# Patient Record
Sex: MALE | Race: OTHER | Employment: OTHER | URBAN - METROPOLITAN AREA
[De-identification: names, ages, dates, MRNs, and addresses within clinical notes are randomized per-mention and may not be internally consistent; named-entity substitution may affect disease eponyms.]

---

## 2022-11-04 PROCEDURE — 12015 RPR F/E/E/N/L/M 7.6-12.5 CM: CPT

## 2022-11-04 PROCEDURE — 36415 COLL VENOUS BLD VENIPUNCTURE: CPT

## 2022-11-04 PROCEDURE — 93010 ELECTROCARDIOGRAM REPORT: CPT

## 2022-11-04 PROCEDURE — 99285 EMERGENCY DEPT VISIT HI MDM: CPT

## 2022-11-05 ENCOUNTER — HOSPITAL ENCOUNTER (EMERGENCY)
Facility: HOSPITAL | Age: 42
Discharge: HOME | End: 2022-11-05
Attending: EMERGENCY MEDICINE
Payer: MEDICAID

## 2022-11-05 ENCOUNTER — APPOINTMENT (OUTPATIENT)
Dept: MRI IMAGING | Facility: HOSPITAL | Age: 42
End: 2022-11-05
Attending: EMERGENCY MEDICINE
Payer: MEDICAID

## 2022-11-05 ENCOUNTER — APPOINTMENT (OUTPATIENT)
Dept: CT IMAGING | Facility: HOSPITAL | Age: 42
End: 2022-11-05
Attending: EMERGENCY MEDICINE
Payer: MEDICAID

## 2022-11-05 VITALS
DIASTOLIC BLOOD PRESSURE: 98 MMHG | WEIGHT: 185 LBS | BODY MASS INDEX: 25.9 KG/M2 | OXYGEN SATURATION: 97 % | HEIGHT: 71 IN | HEART RATE: 76 BPM | SYSTOLIC BLOOD PRESSURE: 126 MMHG | TEMPERATURE: 98 F | RESPIRATION RATE: 16 BRPM

## 2022-11-05 DIAGNOSIS — R73.9 HYPERGLYCEMIA: ICD-10-CM

## 2022-11-05 DIAGNOSIS — R90.89 ABNORMAL CT OF BRAIN: ICD-10-CM

## 2022-11-05 DIAGNOSIS — S00.03XA HEMATOMA OF SCALP, INITIAL ENCOUNTER: ICD-10-CM

## 2022-11-05 DIAGNOSIS — S02.2XXA CLOSED FRACTURE OF NASAL BONE, INITIAL ENCOUNTER: ICD-10-CM

## 2022-11-05 DIAGNOSIS — R55 SYNCOPE AND COLLAPSE: Primary | ICD-10-CM

## 2022-11-05 DIAGNOSIS — S01.111A COMPLEX LACERATION OF RIGHT EYEBROW, INITIAL ENCOUNTER: ICD-10-CM

## 2022-11-05 DIAGNOSIS — S01.21XA NASAL LACERATION, INITIAL ENCOUNTER: ICD-10-CM

## 2022-11-05 LAB
ALBUMIN SERPL-MCNC: 4.3 G/DL (ref 3.4–5)
ALBUMIN/GLOB SERPL: 1.2 {RATIO} (ref 1–2)
ALP LIVER SERPL-CCNC: 53 U/L
ALT SERPL-CCNC: 75 U/L
ANION GAP SERPL CALC-SCNC: 4 MMOL/L (ref 0–18)
AST SERPL-CCNC: 40 U/L (ref 15–37)
ATRIAL RATE: 73 BPM
BASOPHILS # BLD AUTO: 0.04 X10(3) UL (ref 0–0.2)
BASOPHILS NFR BLD AUTO: 0.3 %
BILIRUB SERPL-MCNC: 1.5 MG/DL (ref 0.1–2)
BUN BLD-MCNC: 13 MG/DL (ref 7–18)
CALCIUM BLD-MCNC: 9.4 MG/DL (ref 8.5–10.1)
CHLORIDE SERPL-SCNC: 106 MMOL/L (ref 98–112)
CO2 SERPL-SCNC: 29 MMOL/L (ref 21–32)
CREAT BLD-MCNC: 1 MG/DL
EOSINOPHIL # BLD AUTO: 0.03 X10(3) UL (ref 0–0.7)
EOSINOPHIL NFR BLD AUTO: 0.2 %
ERYTHROCYTE [DISTWIDTH] IN BLOOD BY AUTOMATED COUNT: 13 %
ETHANOL SERPL-MCNC: 6 MG/DL (ref ?–3)
GFR SERPLBLD BASED ON 1.73 SQ M-ARVRAT: 96 ML/MIN/1.73M2 (ref 60–?)
GLOBULIN PLAS-MCNC: 3.5 G/DL (ref 2.8–4.4)
GLUCOSE BLD-MCNC: 130 MG/DL (ref 70–99)
HCT VFR BLD AUTO: 44.8 %
HGB BLD-MCNC: 15.9 G/DL
IMM GRANULOCYTES # BLD AUTO: 0.03 X10(3) UL (ref 0–1)
IMM GRANULOCYTES NFR BLD: 0.2 %
LYMPHOCYTES # BLD AUTO: 1.3 X10(3) UL (ref 1–4)
LYMPHOCYTES NFR BLD AUTO: 9.2 %
MCH RBC QN AUTO: 31 PG (ref 26–34)
MCHC RBC AUTO-ENTMCNC: 35.5 G/DL (ref 31–37)
MCV RBC AUTO: 87.3 FL
MONOCYTES # BLD AUTO: 0.56 X10(3) UL (ref 0.1–1)
MONOCYTES NFR BLD AUTO: 3.9 %
NEUTROPHILS # BLD AUTO: 12.23 X10 (3) UL (ref 1.5–7.7)
NEUTROPHILS # BLD AUTO: 12.23 X10(3) UL (ref 1.5–7.7)
NEUTROPHILS NFR BLD AUTO: 86.2 %
OSMOLALITY SERPL CALC.SUM OF ELEC: 290 MOSM/KG (ref 275–295)
P AXIS: 50 DEGREES
P-R INTERVAL: 142 MS
PLATELET # BLD AUTO: 270 10(3)UL (ref 150–450)
POTASSIUM SERPL-SCNC: 4.1 MMOL/L (ref 3.5–5.1)
PROT SERPL-MCNC: 7.8 G/DL (ref 6.4–8.2)
Q-T INTERVAL: 364 MS
QRS DURATION: 72 MS
QTC CALCULATION (BEZET): 401 MS
R AXIS: 20 DEGREES
RBC # BLD AUTO: 5.13 X10(6)UL
SODIUM SERPL-SCNC: 139 MMOL/L (ref 136–145)
T AXIS: 25 DEGREES
TROPONIN I HIGH SENSITIVITY: 4 NG/L
VENTRICULAR RATE: 73 BPM
WBC # BLD AUTO: 14.2 X10(3) UL (ref 4–11)

## 2022-11-05 PROCEDURE — 70450 CT HEAD/BRAIN W/O DYE: CPT | Performed by: EMERGENCY MEDICINE

## 2022-11-05 PROCEDURE — 85025 COMPLETE CBC W/AUTO DIFF WBC: CPT | Performed by: EMERGENCY MEDICINE

## 2022-11-05 PROCEDURE — 72125 CT NECK SPINE W/O DYE: CPT | Performed by: EMERGENCY MEDICINE

## 2022-11-05 PROCEDURE — 82077 ASSAY SPEC XCP UR&BREATH IA: CPT | Performed by: EMERGENCY MEDICINE

## 2022-11-05 PROCEDURE — 84484 ASSAY OF TROPONIN QUANT: CPT | Performed by: EMERGENCY MEDICINE

## 2022-11-05 PROCEDURE — 93005 ELECTROCARDIOGRAM TRACING: CPT

## 2022-11-05 PROCEDURE — 70486 CT MAXILLOFACIAL W/O DYE: CPT | Performed by: EMERGENCY MEDICINE

## 2022-11-05 PROCEDURE — 70551 MRI BRAIN STEM W/O DYE: CPT | Performed by: EMERGENCY MEDICINE

## 2022-11-05 PROCEDURE — 80053 COMPREHEN METABOLIC PANEL: CPT | Performed by: EMERGENCY MEDICINE

## 2022-11-05 RX ORDER — CEPHALEXIN 500 MG/1
500 CAPSULE ORAL 2 TIMES DAILY
Qty: 14 CAPSULE | Refills: 0 | Status: SHIPPED | OUTPATIENT
Start: 2022-11-05 | End: 2022-11-12

## 2022-11-05 RX ORDER — HYDROCODONE BITARTRATE AND ACETAMINOPHEN 5; 325 MG/1; MG/1
1 TABLET ORAL ONCE
Status: COMPLETED | OUTPATIENT
Start: 2022-11-05 | End: 2022-11-05

## 2022-11-05 NOTE — ED QUICK NOTES
On arrival to desk, prior to pts name being entered into system pts friends states \"I am going to need you to expedite getting him seen as soon as possible\"   explained to both pt and friend that pts are seen by acuity and we have long wait times at this time. Pts friend argumentative and demanding to get pt seen sooner. Explained again and limits set.

## 2022-11-05 NOTE — ED NOTES
MRI BRAIN (CPT=70551)    Result Date: 11/5/2022  PROCEDURE:  MRI BRAIN (CPT=70551)  LOCATION:  [facility] , CT, CT BRAIN OR HEAD ([id number]), 11/05/2022, 3:47 AM.  INDICATIONS:  r/o midbrain ICH per Ct brain  TECHNIQUE:  MRI of the brain was performed with multi-planar T1, T2-weighted images with FLAIR sequences and diffusion weighted images without infusion. PATIENT STATED HISTORY: (As transcribed by Technologist)  Syncope with collapse this am. Patient fell and hit his head. Findings on Cat Scan today. Evaluate for midbrain ICH. FINDINGS:  INTRACRANIAL:  There are no focal parenchymal brain abnormalities. Diffusion weighted imaging was       performed and is unremarkable. There is no evidence for acute infarction. There is no      evidence of hemorrhage or mass lesion. VENTRICLES/SULCI:   Ventricles and sulci are normal in caliber. There are no extra-axial fluid collections. There is no midline shift. SINUSES/ORBITS:       The visualized paranasal sinuses are clear. The orbits are unremarkable. MASTOIDS:      The mastoids are clear. CONCLUSION:  No acute intracranial abnormality. Hyperdensity in the right mid brain is compatible with artifact. Dictated by (CST): Sakshi Henderson MD on 11/05/2022 at 9:52 AM     Finalized by (CST): Sakshi Henderson MD on 11/05/2022 at 9:57 AM     .  Patient was signed out that if MRI is negative for any acute pathology patient can go home. CT MRI does not show any acute intracranial pathology.

## 2022-11-05 NOTE — ED QUICK NOTES
Pt reevaluated by er physician.  Pt informed of all his test reports and plan of care especially follow up with ent and plastic surgeons assigned, pt verbalizing understanding

## 2022-11-05 NOTE — ED INITIAL ASSESSMENT (HPI)
Pt presents to ED for fall, pt states was driving and felt dizzy and nauseous then suddenly passed out, right eye and nose cut with glasses, pt believes nose is broken.

## 2022-11-05 NOTE — ED NOTES
Result Date: 11/5/2022  PROCEDURE:  MRI BRAIN (CPT=70551)  LOCATION:  [facility] , CT, CT BRAIN OR HEAD ([id number]), 11/05/2022, 3:47 AM.  INDICATIONS:  r/o midbrain ICH per Ct brain  TECHNIQUE:  MRI of the brain was performed with multi-planar T1, T2-weighted images with FLAIR sequences and diffusion weighted images without infusion. PATIENT STATED HISTORY: (As transcribed by Technologist)  Syncope with collapse this am. Patient fell and hit his head. Findings on Cat Scan today. Evaluate for midbrain ICH. FINDINGS:  INTRACRANIAL:  There are no focal parenchymal brain abnormalities. Diffusion weighted imaging was       performed and is unremarkable. There is no evidence for acute infarction. There is no      evidence of hemorrhage or mass lesion. VENTRICLES/SULCI:   Ventricles and sulci are normal in caliber. There are no extra-axial fluid collections. There is no midline shift. SINUSES/ORBITS:       The visualized paranasal sinuses are clear. The orbits are unremarkable. MASTOIDS:      The mastoids are clear. CONCLUSION:  No acute intracranial abnormality. Hyperdensity in the right mid brain is compatible with artifact. Dictated by (CST): Sakshi Henderson MD on 11/05/2022 at 9:52 AM     Finalized by (CST): Sakshi Henderson MD on 11/05/2022 at 9:57 AM     The patient was signed out to me that if the MRI is negative patient be discharged home. I did discuss with the patient that the MRI did not show any obvious bleed. This was an artifact I recommend close follow-up with his primary care physician. And ENT and plastics. I reexamination he is alert and oriented he is got a normal gait exam no focal findings. He was discharged home with close follow-up.

## 2022-11-08 ENCOUNTER — OFFICE VISIT (OUTPATIENT)
Facility: LOCATION | Age: 42
End: 2022-11-08
Payer: MEDICAID

## 2022-11-08 DIAGNOSIS — S00.83XA CONTUSION OF FACE, INITIAL ENCOUNTER: ICD-10-CM

## 2022-11-08 DIAGNOSIS — S02.2XXA CLOSED FRACTURE OF NASAL BONE, INITIAL ENCOUNTER: Primary | ICD-10-CM

## 2022-11-08 PROCEDURE — 99204 OFFICE O/P NEW MOD 45 MIN: CPT | Performed by: OTOLARYNGOLOGY

## 2022-11-16 ENCOUNTER — OFFICE VISIT (OUTPATIENT)
Facility: LOCATION | Age: 42
End: 2022-11-16
Payer: MEDICAID

## 2022-11-16 DIAGNOSIS — S00.83XD CONTUSION OF FACE, SUBSEQUENT ENCOUNTER: ICD-10-CM

## 2022-11-16 DIAGNOSIS — S02.2XXD CLOSED FRACTURE OF NASAL BONE WITH ROUTINE HEALING, SUBSEQUENT ENCOUNTER: Primary | ICD-10-CM

## 2022-11-16 PROCEDURE — 99213 OFFICE O/P EST LOW 20 MIN: CPT | Performed by: OTOLARYNGOLOGY

## 2023-02-28 ENCOUNTER — OFFICE VISIT (OUTPATIENT)
Facility: LOCATION | Age: 43
End: 2023-02-28
Payer: MEDICAID

## 2023-02-28 DIAGNOSIS — J32.8 OTHER CHRONIC SINUSITIS: Primary | ICD-10-CM

## 2023-02-28 DIAGNOSIS — S02.2XXD CLOSED FRACTURE OF NASAL BONE WITH ROUTINE HEALING, SUBSEQUENT ENCOUNTER: ICD-10-CM

## 2023-02-28 PROCEDURE — 99213 OFFICE O/P EST LOW 20 MIN: CPT | Performed by: OTOLARYNGOLOGY

## 2023-03-06 ENCOUNTER — TELEPHONE (OUTPATIENT)
Dept: ADMINISTRATIVE | Age: 43
End: 2023-03-06

## 2023-03-06 NOTE — TELEPHONE ENCOUNTER
Hello,    Requested prior authorization of CT SINUS CPT=70486 has been denied by payer/[employer]. Denial rationale:  \"Based on [facility] Section(s): Sinus and Facial Imaging (HD 29.1), we cannot approve this request. Your healthcare provider told us that there is a concern related to your sinuses. The request cannot be approved because:    A study similar to the one requested has recently been performed. The results of that study showed your doctor what they needed to see in order to treat your condition. No additional imaging is necessary at this time. It must be noted that you have chronic sinusitis (a problem with your sinuses being inflamed for more than 12 weeks). You must have at least two of the following signs and symptoms.   -Fluid containing mucus and pus secreting from your nose.   -A blockage or congestion in your nose. -Pain, pressure, or fullness in your face.   -A decreased sense of smell. \"    Case is [id number] eligible until end of business 03/09/2023. [id number] discussion may be scheduled by calling [employer] at [phone], select Option 4. Reference number [id number] will need to be entered or provided. Case is appeal eligible for the next 55 calendar days. Appeal information may be found in denial letter below. Please note: patient is scheduled for imaging 03/16/2023. Please advise,  Thank you!